# Patient Record
(demographics unavailable — no encounter records)

---

## 2024-10-31 NOTE — ASSESSMENT
[FreeTextEntry1] : Ms. FULLER is a 25 year female scheduled for surgery 12/9/24 - revision RIGHT nasal valve with removal of previous graft,right valve suspension suture, right  graft, possible left  graft, needs MTF

## 2024-10-31 NOTE — END OF VISIT
[FreeTextEntry3] : I personally saw and examined REID FULLER  in detail. I spoke to AROLDO Mcfarlane regarding the assessment and plan of care. I performed the procedures and relevant physical exam. I have made changes to the body of the note wherever necessary and appropriate.

## 2024-10-31 NOTE — HISTORY OF PRESENT ILLNESS
[de-identified] :  - s/p closed rhino with nostril narrowing in 2018 with [facility]; was happy with appearance postsurgery, but developed bilateral nasal obstruction. - s/p revision open septoplasty, repair of NVS b/l jayde and francis 12/6/21 with me - prior to surgery she had bilateral congestion worse on R side, she improved after surgery but never resolved. continued R sided obstruction despite using Flonase, left side she felt 80% better R side 20% - s/p revision closed septo, turbinate reduction, nvr R batten and R bivalve splint 8/28/23 at 3 months breathing 70% better   [FreeTextEntry1] : pt here to discuss upcoming surgery feels obstruction high on the right bothered by appearance of tip

## 2024-12-10 NOTE — END OF VISIT
[FreeTextEntry3] :  I personally saw and examined REID FULLER in detail.  I spoke to BEATRICE BILL regarding the assessment and plan of care. I performed the procedures and relevant physical exam.   I have made changes to the body of the note wherever necessary and appropriate.

## 2024-12-10 NOTE — HISTORY OF PRESENT ILLNESS
[de-identified] : 24y/o female s/p revision of right nasal valve with removal of graft 12/9/24. Her pain is controlled. She has some little bleeding from the nose last night. She has no active oozing from the nose. She can breathe through her nose but still has some nasal congestion. She has a slight HA. She denies any fevers or chills.

## 2024-12-10 NOTE — REASON FOR VISIT
[Post-Operative Visit] : a post-operative visit [FreeTextEntry2] : s/p revision of right nasal valve with removal of graft 12/9/24

## 2024-12-10 NOTE — ASSESSMENT
[FreeTextEntry1] : 24 y/o female s/p revision of right nasal valve with removal of graft 12/9/24, placement of b/l nasal valve suspension sutures and right  graft and punch right alar incision, overall doing well.  Notes improvement in left sided breathing already.   -Postop Cleaning care was reviewed with patient and a cleaning regimen handout was given -Continue nasal saline spray a few times a day -Avoid blowing nose for 2 weeks, avoid heavy lifting or strenuous exercise for 3 weeks -Keep nasal tape in place may fall off in 3 days if not will remove at next visit -Sutures will be removed next week -F/u in 1 week

## 2024-12-10 NOTE — PHYSICAL EXAM
[Normal] : no abnormal secretions [de-identified] : nasal tape in place  [de-identified] : dried blood in bilateral nares

## 2024-12-17 NOTE — HISTORY OF PRESENT ILLNESS
[de-identified] : 26y/o female s/p closed rhino with nostril narrowing 2018 with Plastics with congestion s/p revision open septoplasty, repair of NVS b/l spreaders and battens 12/6/21   - s/p revision of right nasal valve with removal of graft 12/9/24 [FreeTextEntry1] : pt here for 1 week f/u she has no pain, was breathing better right after surgery but does feel congested R >L

## 2024-12-17 NOTE — ASSESSMENT
[FreeTextEntry1] : Ms. FULLER is a 25 year female s/p revision of right nasal valve with removal of graft 12/9/24 doing very well minimal edema b/l nasal mucosa, no crusting   - tape placed with instructions for nightly use - c/w nasal saline x 1 more week, can use larger squirts at this point - ok to wash face normally, recommend using astringents to clean skin of nose - no strenuous activity for 1 week, no heavy lifting for 2 weeks, no glasses for 3 weeks - may blow your nose in another week - ok to dc peroxide cleanings. - f/u 3 weeks

## 2024-12-17 NOTE — PHYSICAL EXAM
[Normal] : normal appearance, well groomed, well nourished, and in no acute distress [de-identified] : tape removed [de-identified] : minimal secretions

## 2024-12-24 NOTE — ASSESSMENT
[FreeTextEntry1] : Ms. FULLER is a 25 year female s/p revision of right nasal valve with removal of graft 12/9/24 doing well, no alar base suture seen today, area healing well  - c/w saline spray - f/u 1/9/24

## 2025-01-09 NOTE — PROCEDURE
[de-identified] : Dr. Carmona [de-identified] : Reason for nasal endoscopy: anterior rhinoscopy insufficient to account for symptoms.   Flexible scope #203 was used. Bilateral nasal dryness and irritation bilateral nares. Right nasal passage with normal inferior, middle and superior turbinates. Nasal passage patent with clear middle meatus and sphenoethmoid recess. Left nasal passage with normal inferior, middle and superior turbinates. Nasal passage was patent with clear middle meatus and sphenoethmoid recess. No mucopurulence or polyps appreciated. Nasopharynx clear.

## 2025-01-09 NOTE — ASSESSMENT
[FreeTextEntry1] : Ms. FULLER is a 25 year female s/p revision of right nasal valve with removal of graft 12/9/24 doing well, no alar base suture seen today, area healing well. She is breathing better but has been getting blood-tinged mucus when she blows her nose  Nasal Endoscopy shows mild dryness and irritation in b/l nares, no bleeding coming from the incision sites from the surgery  -Recommend using Aquaphor or Vaseline in the nose to help keep the nasal cavity moist - c/w saline spray -Pt brought up bringing up removing her  graft that was just placed because she does not like the way it looks. Recommended waiting for 3 months because she has only been out of surgery for 1 month and is only now beginning to breath.  If she is still unhappy with the appearance when the swelling has settled, I can remove potentially even in the office at the same time as zafar if needed.   -F/u in 2 months

## 2025-01-09 NOTE — HISTORY OF PRESENT ILLNESS
[de-identified] : s/p revision nvr and R alar base revision 12/9/24 here for suture removal  [FreeTextEntry1] : She is here for f/u. She is breathing better. She has a little nasal congestion today. She states she has been blowing her nose and gets blood tinged mucous for the past few days. She does not have an active drip.  Pt denies any nasal drainage, PND, facial pain or pressure, runny nose or sinus infections. Her Sense of smell is good.

## 2025-01-23 NOTE — PHYSICAL EXAM
[FreeTextEntry2] : Cary [de-identified] : no lesions; well-healed; pinpoint pigmented lesion on mons is being monitored by derm, per patient [de-identified] : b/l adnexa not palpable on bimanual exam.

## 2025-01-23 NOTE — ASSESSMENT
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

## 2025-01-23 NOTE — HISTORY OF PRESENT ILLNESS
[FreeTextEntry1] : Ms. Álvarez has a h/o malignant melanoma of the vulva.    Surgical pathology-office biopsy ([facility], 5/15/2019):   Vulvar- malignant melanoma, approximately 0.4 mm in thickness, arising in association with a melanocytic nevus.  The lesion extends to the base and peripheral margin.  6/11/19 PET/CT negative.  6/19/19: WLE with partial vulvectomy: Invasive epithelioid mucosal melanoma, tumor thickness arising in association with a melanocytic nevus, predominantly intradermal type; negative margins, no ulceration, intermediate proliferation rate; (-)LVI, (-) perineural invasion; stage IB  She has no significant PMH.  Periods are regular monthly; she stopped OCPs in May 2019.   She has received the Gardasil vaccination.   Family history is significant only for a maternal uncle with history of basal cell carcinoma on the temple.  Genetic consultation was done, genetic testing was discussed, but patient declined testing at this time.  Denies any vulvar complaints or new lesions, or any other associated signs and symptoms.  She saw Dr. Bach in Feb 2024.  Health Maintenance: Pap smear 2/2024: negative COVID vaccine- Moderna  Referring MD/GYN- Dr. Quyen Bach  PCP- [facility] Dermatologist: Dr. Josue

## 2025-01-23 NOTE — DISCUSSION/SUMMARY
[FreeTextEntry1] : - exam findings reviewed; signs and symptoms of recurrence discussed; self-monitoring discussed  - she is seeing Dr. Bach for GYN HM tomm - genetic testing declined by patient after consultation - follow up with dermatology at [facility] [place] as scheduled - f/u in 6 months - all questions answered.

## 2025-01-23 NOTE — LETTER BODY
[FreeTextEntry2] : She underwent excision of the lesion and close followup is needed. She will see me regularly for exams and she will see you for routine GYN matters. I will keep you updated on her progress. Thank you again for referring this gabe patient.

## 2025-03-19 NOTE — HISTORY OF PRESENT ILLNESS
[de-identified] : s/p revision nvr and R alar base revision 12/9/24   [FreeTextEntry1] : She is here for f/u. she is breathing great from L side 100% improved, R side has improved as well over the last 2 months now 70% improved. she is not using any sprays. still with some bleeding R side as well would like to follow through with graft removal and Vivaer procedure 4/9/25 to try to get improved breathing on the right side

## 2025-03-19 NOTE — ASSESSMENT
[FreeTextEntry1] : Ms. FULLER is a 25 year female s/p revision of right nasal valve with removal of graft 12/9/24 doing well. She is breathing better but still with some congestion on R side.   - will follow through with graft removal and Vivaer procedure 4/9/25 - rx Tetracaine sent and pt aware of fees - pt unable to wait for photos today, will arrive a few min early and take photos just prior to procedure -Will remove the right  graft and plan to do vivaer for the right internal valve to try to improve nasal obstruction on the side

## 2025-03-19 NOTE — PHYSICAL EXAM
[Normal] : no abnormal secretions [de-identified] : Still with some internal valve collapse on the right [de-identified] : Slightly enlarged on the right

## 2025-04-10 NOTE — HISTORY OF PRESENT ILLNESS
[de-identified] : s/p zafar 4/9/25 pt arrived today for an urgent visit, she is concerned with the swelling of the nose after the procedure yesterday no pain, no bleeding, she is breathing well

## 2025-04-10 NOTE — ASSESSMENT
[FreeTextEntry1] : Ms. FULLER is a 25 year female s/p Vivaer 4/9/25  - reassured there will be no visible changes from the Vivaer procedure, mild swelling may be due to extensive dissection on R side- I needed to dissect the entire pocket to look for the graft as it had disintegrated and I wanted to be sure it was not there.   - would encourage taping the nose at night for the next week - c/w saline spray - keep 1 month f/up

## 2025-04-15 NOTE — HISTORY OF PRESENT ILLNESS
[No] : Patient does not have concerns regarding sex [Currently Active] : currently active [Men] : men [FreeTextEntry1] : 04/15/2025. REID FULLER 25 year old female G0 LMP 03/15/2025. She presents for an annual gyn exam.  She reports monthly menses, not too heavy, not painful. She denies intermenstrual bleeding.  She notes vaginal odor at times  She denies abdominal and pelvic pain, no vaginal discharge or vaginitis symptoms. She reports normal urination, no dysuria, no incontinence of urine. BM is normal per patient, no blood in stool or constipation/diarrhea.  She discontinued her Lexapro.  Started Zoloft,.  She is sexually active with men, uses condoms, denies dysfunction. She has taken COCPs in the past, and had mood changes  She follows with Dr. Gilmore due to h/o vulvar malignant melanoma. She sees her PCP annually and has begun taking an iron supplement. She follows with dermatologist annually.   MedHx: malignant melanoma of the left vulva, ADD and Anxiety Meds: Vyvanse, Adderall, Zyrtec, Zoloft, Iron  ObHx: G0 GynHx: h/o left vulva malignant melanoma. PAP: 1/22- NILM, HPV+ No Hx of FIbroids, Ovarian cysts or pelvic infections. SurgHx: Wide local excision with partial vulvectomy 06/2019, Rhinoplasty x 4 (2013, 2021, 2023, 2024), tonsillectomy (2011)  FamHx: Denies FHx of breast, ovarian, uterine or colon cancer. All: NKDA, pineapples Social: Denies tobacco or drug use, Social alcohol. Works in tech sales.  Vaccine: S/P Gardasil PHQ9=2 [TextBox_4] : STD screening 02/2024: negative  [PapSmeardate] : 02/2024 [TextBox_31] : CONSTANCE

## 2025-04-15 NOTE — PROCEDURE
[Cervical Pap Smear] : cervical Pap smear [Liquid Base] : liquid base [GC & Chlamydia via Pap] : GC & Chlamydia via Pap [Tolerated Well] : the patient tolerated the procedure well [No Complications] : there were no complications [de-identified] : vaginitis panel

## 2025-04-15 NOTE — PLAN
[FreeTextEntry1] : Health Maintenance: BSA, calcium, vitamin D and exercise d/w pt Pap smear conducted w/ reflex to HPV and GC/Chl off pap STI labs drawn today Advised pt to see PCP and dermatologist annually  Vaginal odor UA negative UCx sent  Vaginitis panel done  Hx of vulvar malignant melanoma:  To follow annually w/ Dr. Adamson vulvar exam wnl today  Contraception Counseling:  R/B/A COCPS vs. LARCs (IUD, Nexplanon) vs Nuvaring Pt satisfied with condoms at this time Pt to consider IUD Kyleena or Paragard   RTO PRN or for annual gyn exam

## 2025-04-15 NOTE — PHYSICAL EXAM
[Chaperoned Physical Exam] : A chaperone was present in the examining room during all aspects of the physical examination. [Appropriately responsive] : appropriately responsive [Alert] : alert [No Acute Distress] : no acute distress [No Lymphadenopathy] : no lymphadenopathy [Regular Rate Rhythm] : regular rate rhythm [No Murmurs] : no murmurs [Clear to Auscultation B/L] : clear to auscultation bilaterally [Soft] : soft [Non-tender] : non-tender [Non-distended] : non-distended [No HSM] : No HSM [No Lesions] : no lesions [No Mass] : no mass [Oriented x3] : oriented x3 [Examination Of The Breasts] : a normal appearance [No Masses] : no breast masses were palpable [Labia Majora] : normal [Labia Minora] : normal [Normal] : normal [Uterine Adnexae] : normal [FreeTextEntry2] :  Katlin diaz)

## 2025-04-15 NOTE — SIGNATURES
[TextEntry] : I, Katlin Tao, am scribing for and in the presence of JOHN Nicolas M.D. in the following sections: HISTORY OF PRESENT ILLNESS, PAST MEDICAL/FAMILY/SOCIAL HISTORY, REVIEW OF SYSTEMS, PHYSICAL EXAM, ASSESSMENT/PLAN.  All medical record entries made by the Scribe were at my,  JOHN Nicolas M.D., direction and personally dictated by me on 04/10/2025. I have personally reviewed the chart and agree that the record reflects my personal performance of the history, physical exam, assessment, and plan.

## 2025-04-15 NOTE — COUNSELING
[Nutrition/ Exercise/ Weight Management] : nutrition, exercise, weight management [Vitamins/Supplements] : vitamins/supplements [Bladder Hygiene] : bladder hygiene [Contraception/ Emergency Contraception/ Safe Sexual Practices] : contraception, emergency contraception, safe sexual practices [Confidentiality] : confidentiality [STD (testing, results, tx)] : STD (testing, results, tx) [Lab Results] : lab results